# Patient Record
Sex: MALE | Race: WHITE
[De-identification: names, ages, dates, MRNs, and addresses within clinical notes are randomized per-mention and may not be internally consistent; named-entity substitution may affect disease eponyms.]

---

## 2019-03-28 ENCOUNTER — HOSPITAL ENCOUNTER (OUTPATIENT)
Dept: HOSPITAL 75 - RAD | Age: 69
End: 2019-03-28
Attending: FAMILY MEDICINE
Payer: MEDICARE

## 2019-03-28 DIAGNOSIS — R13.10: ICD-10-CM

## 2019-03-28 DIAGNOSIS — E01.0: Primary | ICD-10-CM

## 2019-03-28 PROCEDURE — 76536 US EXAM OF HEAD AND NECK: CPT

## 2019-03-28 NOTE — DIAGNOSTIC IMAGING REPORT
PROCEDURE: US Thyroid.



TECHNIQUE: Multiple real-time grayscale images were obtained of

the thyroid in various projections.



INDICATION: Thyromegaly and dysphagia.



FINDINGS: Right lobe of the thyroid measures 4.5 x 1.6 x 0.8 cm

and the left lobe measures 3.8 x 1.0 x 1.0 cm. The isthmus is 3

mm in thickness. Both lobes of thyroid are heterogeneous.

However, no discrete thyroid mass is detected.



IMPRESSION: Thyroid parenchymal heterogeneity. No discrete

thyroid mass is detected.



Dictated by: 



  Dictated on workstation # [id number]

## 2020-09-30 ENCOUNTER — HOSPITAL ENCOUNTER (OUTPATIENT)
Dept: HOSPITAL 75 - LABNPT | Age: 70
End: 2020-09-30
Attending: FAMILY MEDICINE
Payer: MEDICARE

## 2020-09-30 DIAGNOSIS — Z20.828: ICD-10-CM

## 2020-09-30 DIAGNOSIS — R07.89: ICD-10-CM

## 2020-09-30 DIAGNOSIS — J02.9: Primary | ICD-10-CM

## 2020-09-30 PROCEDURE — 87635 SARS-COV-2 COVID-19 AMP PRB: CPT

## 2021-01-29 ENCOUNTER — HOSPITAL ENCOUNTER (OUTPATIENT)
Dept: HOSPITAL 75 - LAB | Age: 71
End: 2021-01-29
Attending: OPHTHALMOLOGY
Payer: MEDICARE

## 2021-01-29 DIAGNOSIS — G43.119: Primary | ICD-10-CM

## 2021-01-29 LAB
BUN/CREAT SERPL: 10
CALCIUM SERPL-MCNC: 9.7 MG/DL (ref 8.5–10.1)
CHLORIDE SERPL-SCNC: 100 MMOL/L (ref 98–107)
CO2 SERPL-SCNC: 27 MMOL/L (ref 21–32)
CREAT SERPL-MCNC: 1.26 MG/DL (ref 0.6–1.3)
ERYTHROCYTE [SEDIMENTATION RATE] IN BLOOD: 8 MM/HR (ref 0–30)
GFR SERPLBLD BASED ON 1.73 SQ M-ARVRAT: 56 ML/MIN
GLUCOSE SERPL-MCNC: 96 MG/DL (ref 70–105)
HCT VFR BLD CALC: 46 % (ref 40–54)
HGB BLD-MCNC: 15.3 G/DL (ref 13.3–17.7)
MCH RBC QN AUTO: 34 PG (ref 25–34)
MCHC RBC AUTO-ENTMCNC: 34 G/DL (ref 32–36)
MCV RBC AUTO: 100 FL (ref 80–99)
PLATELET # BLD: 270 10^3/UL (ref 130–400)
PMV BLD AUTO: 10 FL (ref 9–12.2)
POTASSIUM SERPL-SCNC: 5 MMOL/L (ref 3.6–5)
SODIUM SERPL-SCNC: 137 MMOL/L (ref 135–145)
WBC # BLD AUTO: 7.2 10^3/UL (ref 4.3–11)

## 2021-01-29 PROCEDURE — 85652 RBC SED RATE AUTOMATED: CPT

## 2021-01-29 PROCEDURE — 80048 BASIC METABOLIC PNL TOTAL CA: CPT

## 2021-01-29 PROCEDURE — 86141 C-REACTIVE PROTEIN HS: CPT

## 2021-01-29 PROCEDURE — 85027 COMPLETE CBC AUTOMATED: CPT

## 2021-01-29 PROCEDURE — 36415 COLL VENOUS BLD VENIPUNCTURE: CPT

## 2021-08-04 ENCOUNTER — HOSPITAL ENCOUNTER (OUTPATIENT)
Dept: HOSPITAL 75 - LAB | Age: 71
End: 2021-08-04
Attending: FAMILY MEDICINE
Payer: MEDICARE

## 2021-08-04 DIAGNOSIS — R35.1: ICD-10-CM

## 2021-08-04 DIAGNOSIS — E03.9: ICD-10-CM

## 2021-08-04 DIAGNOSIS — E78.5: Primary | ICD-10-CM

## 2021-08-04 DIAGNOSIS — R53.83: ICD-10-CM

## 2021-08-04 LAB
ALBUMIN SERPL-MCNC: 4.2 GM/DL (ref 3.2–4.5)
ALP SERPL-CCNC: 52 U/L (ref 40–136)
ALT SERPL-CCNC: 34 U/L (ref 0–55)
BASOPHILS # BLD AUTO: 0.1 10^3/UL (ref 0–0.1)
BASOPHILS NFR BLD AUTO: 1 % (ref 0–10)
BILIRUB SERPL-MCNC: 0.7 MG/DL (ref 0.1–1)
BUN/CREAT SERPL: 11
CALCIUM SERPL-MCNC: 9.4 MG/DL (ref 8.5–10.1)
CHLORIDE SERPL-SCNC: 103 MMOL/L (ref 98–107)
CHOLEST SERPL-MCNC: 204 MG/DL (ref ?–200)
CO2 SERPL-SCNC: 30 MMOL/L (ref 21–32)
CREAT SERPL-MCNC: 1.14 MG/DL (ref 0.6–1.3)
EOSINOPHIL # BLD AUTO: 0.2 10^3/UL (ref 0–0.3)
EOSINOPHIL NFR BLD AUTO: 3 % (ref 0–10)
GFR SERPLBLD BASED ON 1.73 SQ M-ARVRAT: 63 ML/MIN
GLUCOSE SERPL-MCNC: 93 MG/DL (ref 70–105)
HCT VFR BLD CALC: 46 % (ref 40–54)
HDLC SERPL-MCNC: 84 MG/DL (ref 40–60)
HGB BLD-MCNC: 15.5 G/DL (ref 13.3–17.7)
LYMPHOCYTES # BLD AUTO: 1.5 10^3/UL (ref 1–4)
LYMPHOCYTES NFR BLD AUTO: 31 % (ref 12–44)
MANUAL DIFFERENTIAL PERFORMED BLD QL: NO
MCH RBC QN AUTO: 34 PG (ref 25–34)
MCHC RBC AUTO-ENTMCNC: 34 G/DL (ref 32–36)
MCV RBC AUTO: 100 FL (ref 80–99)
MONOCYTES # BLD AUTO: 0.4 10^3/UL (ref 0–1)
MONOCYTES NFR BLD AUTO: 8 % (ref 0–12)
NEUTROPHILS # BLD AUTO: 2.8 10^3/UL (ref 1.8–7.8)
NEUTROPHILS NFR BLD AUTO: 57 % (ref 42–75)
PLATELET # BLD: 267 10^3/UL (ref 130–400)
PMV BLD AUTO: 9.5 FL (ref 9–12.2)
POTASSIUM SERPL-SCNC: 4.9 MMOL/L (ref 3.6–5)
PROT SERPL-MCNC: 8.1 GM/DL (ref 6.4–8.2)
SODIUM SERPL-SCNC: 138 MMOL/L (ref 135–145)
T4 FREE SERPL-MCNC: 1.1 NG/DL (ref 0.7–1.48)
TRIGL SERPL-MCNC: 73 MG/DL (ref ?–150)
VLDLC SERPL CALC-MCNC: 15 MG/DL (ref 5–40)
WBC # BLD AUTO: 4.9 10^3/UL (ref 4.3–11)

## 2021-08-04 PROCEDURE — 84153 ASSAY OF PSA TOTAL: CPT

## 2021-08-04 PROCEDURE — 80061 LIPID PANEL: CPT

## 2021-08-04 PROCEDURE — 85025 COMPLETE CBC W/AUTO DIFF WBC: CPT

## 2021-08-04 PROCEDURE — 83036 HEMOGLOBIN GLYCOSYLATED A1C: CPT

## 2021-08-04 PROCEDURE — 84443 ASSAY THYROID STIM HORMONE: CPT

## 2021-08-04 PROCEDURE — 36415 COLL VENOUS BLD VENIPUNCTURE: CPT

## 2021-08-04 PROCEDURE — 84439 ASSAY OF FREE THYROXINE: CPT

## 2021-08-04 PROCEDURE — 80053 COMPREHEN METABOLIC PANEL: CPT

## 2021-08-21 ENCOUNTER — HOSPITAL ENCOUNTER (EMERGENCY)
Dept: HOSPITAL 75 - ER | Age: 71
Discharge: HOME | End: 2021-08-21
Payer: MEDICARE

## 2021-08-21 VITALS — BODY MASS INDEX: 24.76 KG/M2 | WEIGHT: 180.78 LBS | HEIGHT: 71.65 IN

## 2021-08-21 VITALS — SYSTOLIC BLOOD PRESSURE: 138 MMHG | DIASTOLIC BLOOD PRESSURE: 78 MMHG

## 2021-08-21 DIAGNOSIS — S82.431A: Primary | ICD-10-CM

## 2021-08-21 DIAGNOSIS — S82.391A: ICD-10-CM

## 2021-08-21 DIAGNOSIS — S80.212A: ICD-10-CM

## 2021-08-21 DIAGNOSIS — S80.211A: ICD-10-CM

## 2021-08-21 DIAGNOSIS — Z79.890: ICD-10-CM

## 2021-08-21 DIAGNOSIS — E03.9: ICD-10-CM
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
## 2021-08-21 DIAGNOSIS — Z79.899: ICD-10-CM

## 2021-08-21 PROCEDURE — 29515 APPLICATION SHORT LEG SPLINT: CPT

## 2021-08-21 PROCEDURE — 73610 X-RAY EXAM OF ANKLE: CPT

## 2021-08-21 NOTE — DIAGNOSTIC IMAGING REPORT
INDICATION:  ankle pain



TECHNIQUE:  Three views of the right ankle  



CORRELATION STUDY:  None



FINDINGS: 

Obliquely oriented fracture through the distal fibula shaft.

There is a longitudinally oriented fracture of the distal

posterior tibia. Slight dorsal displacement of the main fracture

fragment. The distal medial malleolus appears to be intact. Ankle

mortise alignment appears to be maintained. Talar dome intact. 

Prominent soft tissue edema is present.



IMPRESSION: 

Mildly displaced distal fibula and posterior tibia fracture with

associated soft tissue swelling.



Dictated by: 



  Dictated on workstation # [id number]

## 2021-08-21 NOTE — ED LOWER EXTREMITY
General


Chief Complaint:  Lower Extremity


Stated Complaint:  R ANKLE PAIN,SWELLING


Nursing Triage Note:  


PT CO OF R ANKLE PAIN FROM SLIPPING ON STEP LAST PM


Source:  patient


Exam Limitations:  no limitations





History of Present Illness


Date Seen by Provider:  Aug 21, 2021


Time Seen by Provider:  08:35


Initial Comments


Patient to the ER by private conveyance from home with chief complaint of about 

6:00 yesterday evening he was running up some stairs with his dog and missed the

last step causing a popping sensation in his right ankle.  He has had increased 

swelling despite elevation ice ibuprofen and Tylenol.  He has no previous 

history of fracture of the ankle.  Significant swelling on his right ankle and 

after couple doses of Advil his pain was well enough he can sleep.  He is not on

blood thinners.





Allergies and Home Medications


Allergies


Coded Allergies:  


     No Known Drug Allergies (Unverified , 4/28/15)





Home Medications


Hydrocodone/Acetaminophen 1 Each Tablet, 1 EACH PO Q4H


   Prescribed by: NAOMI HARDING MD on 16 1303


Levothyroxine Sodium 112 Mcg Tab, 112 MCG PO DAILY, (Reported)


Prednisone 20 Mg Tab, 20 MG PO BID


   Prescribed by: NAOMI HARDING MD on 16 1302





Patient Home Medication List


Home Medication List Reviewed:  Yes





Review of Systems


Constitutional:  No chills, No diaphoresis


EENTM:  No ear discharge, No ear pain


Respiratory:  No cough, No short of breath


Cardiovascular:  No chest pain, No edema


Gastrointestinal:  No abdominal pain, No nausea, No vomiting


Musculoskeletal:  see HPI





All Other Systems Reviewed


Negative Unless Noted:  Yes





Past Medical-Social-Family Hx


Patient Social History


Tobacco Use?:  No





Seasonal Allergies


Seasonal Allergies:  No





Past Medical History


Heart Murmur


Gastroesophageal Reflux


Hypothyroidsim





Physical Exam


Vital Signs





Vital Signs - First Documented








 21





 08:35


 


Temp 35.9


 


Pulse 74


 


Resp 16


 


B/P (MAP) 152/86 (108)


 


Pulse Ox 96





Capillary Refill : Less Than 3 Seconds


Height, Weight, BMI


Height: 6'"


Weight: 185lbs. oz. 83.373813lr; 24.00 BMI


Method:Stated


General Appearance:  WD/WN, mild distress


HEENT:  PERRL/EOMI, pharynx normal


Respiratory:  no respiratory distress, no accessory muscle use


Knees:  bilateral knee non-tender, bilateral knee normal inspection, bilateral 

knee normal range of motion, bilateral knee no evidence of injury, bilateral 

knee other (Superficial abrasion anterior right knee and left medial knee)


Ankles:  left ankle non-tender, left ankle normal inspection; bilateral ankle 

normal range of motion; left ankle no evidence of injury; right ankle pain, 

right ankle soft tissue tenderness, right ankle swelling (moderate)


Feet:  bilateral foot non-tender; left foot normal inspection; bilateral foot 

normal range of motion; left foot no evidence of injury; right foot swelling


Neurologic/Tendon:  normal sensation, normal motor functions, normal tendon 

functions, responds to pain


Neurologic/Psychiatric:  no motor/sensory deficits, alert, normal mood/affect, 

oriented x 3





Procedures/Interventions


Splinting and Joint Reduction :  


   Location:  Right ankle


   Pre-Proc Neuro Vasc Exam:  normal


   Post-Proc Neuro Vasc Exam:  unchanged from pre-exam


   Hand-Made Type:  fiberglass


   Splint Application:  Short Leg





Progress/Results/Core Measures


Results/Orders


My Orders





Orders - SILVIA VALVERDE


Ankle, Right, 3 Views (21 08:47)





Vital Signs/I&O











 21





 08:35


 


Temp 35.9


 


Pulse 74


 


Resp 16


 


B/P (MAP) 152/86 (108)


 


Pulse Ox 96














Blood Pressure Mean:                    108











Progress


Progress Note :  


   Time:  08:53


Progress Note


Ankle sprain versus fracture.  3 views right ankle.  Patient denies needing 

anything for pain.





Diagnostic Imaging





   Diagonstic Imaging:  Xray


   Plain Films/CT/US/NM/MRI:  ankle (Right)


Comments


                 [facility] [place], [place]





NAME:   ARABELLA MAURICE DARRELL


[facility] REC#:   [id number]


ACCOUNT#:   [id number]


PT STATUS:   REG ER


:   1950


PHYSICIAN:   SILVIA VALVERDE MD


ADMIT DATE:   21/ER


                                   ***Draft***


Date of Exam:21





ANKLE, RIGHT, 3 VIEWS








INDICATION:  ankle pain





TECHNIQUE:  Three views of the right ankle  





CORRELATION STUDY:  None





FINDINGS: 


Obliquely oriented fracture through the distal fibula shaft.


There is a longitudinally oriented fracture of the distal


posterior tibia. Slight dorsal displacement of the main fracture


fragment. The distal medial malleolus appears to be intact. Ankle


mortise alignment appears to be maintained. Talar dome intact. 


Prominent soft tissue edema is present.





IMPRESSION: 


Mildly displaced distal fibula and posterior tibia fracture with


associated soft tissue swelling.





  Dictated on workstation # [id number]








Dict:   21


Trans:   21 0940


[facility] [phone]





Interpreted by:     SHIRA DOW DO


Electronically signed by:


   Reviewed:  Reviewed by Me





Departure


Impression





   Primary Impression:  


   Ankle fracture


   Qualified Codes:  S82.891A - Other fracture of right lower leg, initial 

   encounter for closed fracture


Disposition:   HOME, SELF-CARE


Condition:  Stable





Departure-Patient Inst.


Decision time for Depature:  10:44


Referrals:  


RAMON PEÑALOZA DO (PCP/Family)


Primary Care Physician








SCHWAB,TERRY D MD ZAFUTA,MICHAEL P MD


Patient Instructions:  Splint Care ED, Ankle Fracture (DC)





Add. Discharge Instructions:  


Keep the ankle elevated above the level of your heart when not in use.


Use the crutches and do not bear any weight until after you speak to the 

orthopedic surgeon.


Ice 20 minutes on every 2 hours while awake for the next 2 to 3 days to reduce 

swelling and pain.


Tylenol 1000 mg every 8 hours as necessary for pain.


Ibuprofen 800 mg every 8 hours as necessary for pain.


Hydrocodone 1 tablet every 6 hours as necessary for severe breakthrough pain.


Call the orthopedic surgeon of your choice and request follow-up appointment 

sometime in the next week.


If you have numbness or tingling in her toes then loosen the Ace wraps and 

rewrap them looser and elevate your foot.


[facility] [phone].


All discharge instructions reviewed with patient and/or family. Voiced 

understanding.


Scripts


Hydrocodone/Acetaminophen (Hydrocodone-Acetamin 5-325 mg) 1 Each Tablet


1 TAB PO Q6H PRN for PAIN-MODERATE (5-7), #10 TAB 0 Refills


   Prov: SILVIA VALVERDE         21











SILVIA VALVERDE                 Aug 21, 2021 08:53

## 2021-08-25 ENCOUNTER — HOSPITAL ENCOUNTER (OUTPATIENT)
Dept: HOSPITAL 75 - LABNPT | Age: 71
End: 2021-08-25
Attending: PODIATRIST
Payer: MEDICARE

## 2021-08-25 DIAGNOSIS — Z20.822: ICD-10-CM

## 2021-08-25 DIAGNOSIS — Z01.812: Primary | ICD-10-CM

## 2021-08-25 PROCEDURE — 87635 SARS-COV-2 COVID-19 AMP PRB: CPT

## 2021-10-11 ENCOUNTER — HOSPITAL ENCOUNTER (OUTPATIENT)
Dept: HOSPITAL 75 - RAD | Age: 71
End: 2021-10-11
Attending: ORTHOPAEDIC SURGERY
Payer: MEDICARE

## 2021-10-11 ENCOUNTER — HOSPITAL ENCOUNTER (OUTPATIENT)
Dept: HOSPITAL 75 - RAD | Age: 71
End: 2021-10-11
Attending: PODIATRIST
Payer: MEDICARE

## 2021-10-11 DIAGNOSIS — M79.661: Primary | ICD-10-CM

## 2021-10-11 NOTE — DIAGNOSTIC IMAGING REPORT
PROCEDURE: US right lower extremity venous.



TECHNIQUE: Multiple real-time grayscale images were obtained over

the right lower extremity in various projections. Additional

spectral analysis and color Doppler duplex images were also

obtained.



INDICATION: Right lower extremity pain.



There is no evidence of right lower extremity DVT. Right lower

extremity deep venous system shows normal compressibility with

normal response to augmentation and Valsalva. No fluid collection

or mass is detected.



IMPRESSION: No evidence of right lower extremity DVT.



Dictated by: 



  Dictated on workstation # [id number]

## 2022-09-01 ENCOUNTER — HOSPITAL ENCOUNTER (OUTPATIENT)
Dept: HOSPITAL 75 - LAB | Age: 72
End: 2022-09-01
Attending: FAMILY MEDICINE
Payer: MEDICARE

## 2022-09-01 DIAGNOSIS — I10: ICD-10-CM

## 2022-09-01 DIAGNOSIS — E78.2: ICD-10-CM

## 2022-09-01 DIAGNOSIS — Z00.00: Primary | ICD-10-CM

## 2022-09-01 DIAGNOSIS — E03.9: ICD-10-CM

## 2022-09-01 LAB
ALBUMIN SERPL-MCNC: 4.3 GM/DL (ref 3.2–4.5)
ALP SERPL-CCNC: 53 U/L (ref 40–136)
ALT SERPL-CCNC: 23 U/L (ref 0–55)
BASOPHILS # BLD AUTO: 0.1 10^3/UL (ref 0–0.1)
BASOPHILS NFR BLD AUTO: 1 % (ref 0–10)
BILIRUB SERPL-MCNC: 0.9 MG/DL (ref 0.1–1)
BUN/CREAT SERPL: 12
CALCIUM SERPL-MCNC: 9.8 MG/DL (ref 8.5–10.1)
CHLORIDE SERPL-SCNC: 104 MMOL/L (ref 98–107)
CHOLEST SERPL-MCNC: 203 MG/DL (ref ?–200)
CO2 SERPL-SCNC: 29 MMOL/L (ref 21–32)
CREAT SERPL-MCNC: 1.1 MG/DL (ref 0.6–1.3)
EOSINOPHIL # BLD AUTO: 0.2 10^3/UL (ref 0–0.3)
EOSINOPHIL NFR BLD AUTO: 3 % (ref 0–10)
GFR SERPLBLD BASED ON 1.73 SQ M-ARVRAT: 71 ML/MIN
GLUCOSE SERPL-MCNC: 96 MG/DL (ref 70–105)
HCT VFR BLD CALC: 43 % (ref 40–54)
HDLC SERPL-MCNC: 76 MG/DL (ref 40–60)
HGB BLD-MCNC: 14.6 G/DL (ref 13.3–17.7)
LYMPHOCYTES # BLD AUTO: 1.4 10^3/UL (ref 1–4)
LYMPHOCYTES NFR BLD AUTO: 24 % (ref 12–44)
MANUAL DIFFERENTIAL PERFORMED BLD QL: NO
MCH RBC QN AUTO: 34 PG (ref 25–34)
MCHC RBC AUTO-ENTMCNC: 34 G/DL (ref 32–36)
MCV RBC AUTO: 99 FL (ref 80–99)
MONOCYTES # BLD AUTO: 0.5 10^3/UL (ref 0–1)
MONOCYTES NFR BLD AUTO: 8 % (ref 0–12)
NEUTROPHILS # BLD AUTO: 3.6 10^3/UL (ref 1.8–7.8)
NEUTROPHILS NFR BLD AUTO: 63 % (ref 42–75)
PLATELET # BLD: 292 10^3/UL (ref 130–400)
PMV BLD AUTO: 9.3 FL (ref 9–12.2)
POTASSIUM SERPL-SCNC: 4.7 MMOL/L (ref 3.6–5)
PROT SERPL-MCNC: 7.9 GM/DL (ref 6.4–8.2)
SODIUM SERPL-SCNC: 141 MMOL/L (ref 135–145)
T4 FREE SERPL-MCNC: 1.17 NG/DL (ref 0.7–1.48)
TRIGL SERPL-MCNC: 75 MG/DL (ref ?–150)
VLDLC SERPL CALC-MCNC: 15 MG/DL (ref 5–40)
WBC # BLD AUTO: 5.7 10^3/UL (ref 4.3–11)

## 2022-09-01 PROCEDURE — 84153 ASSAY OF PSA TOTAL: CPT

## 2022-09-01 PROCEDURE — 36415 COLL VENOUS BLD VENIPUNCTURE: CPT

## 2022-09-01 PROCEDURE — 80061 LIPID PANEL: CPT

## 2022-09-01 PROCEDURE — 84443 ASSAY THYROID STIM HORMONE: CPT

## 2022-09-01 PROCEDURE — 84439 ASSAY OF FREE THYROXINE: CPT

## 2022-09-01 PROCEDURE — 85025 COMPLETE CBC W/AUTO DIFF WBC: CPT

## 2022-09-01 PROCEDURE — 80053 COMPREHEN METABOLIC PANEL: CPT
